# Patient Record
Sex: FEMALE | Race: WHITE
[De-identification: names, ages, dates, MRNs, and addresses within clinical notes are randomized per-mention and may not be internally consistent; named-entity substitution may affect disease eponyms.]

---

## 2023-01-05 PROBLEM — Z00.00 ENCOUNTER FOR PREVENTIVE HEALTH EXAMINATION: Status: ACTIVE | Noted: 2023-01-05

## 2023-01-06 ENCOUNTER — APPOINTMENT (OUTPATIENT)
Dept: PEDIATRIC ORTHOPEDIC SURGERY | Facility: CLINIC | Age: 58
End: 2023-01-06
Payer: COMMERCIAL

## 2023-01-06 VITALS — WEIGHT: 140 LBS | HEIGHT: 66 IN | TEMPERATURE: 97 F | BODY MASS INDEX: 22.5 KG/M2

## 2023-01-06 DIAGNOSIS — Z86.39 PERSONAL HISTORY OF OTHER ENDOCRINE, NUTRITIONAL AND METABOLIC DISEASE: ICD-10-CM

## 2023-01-06 DIAGNOSIS — M75.41 IMPINGEMENT SYNDROME OF RIGHT SHOULDER: ICD-10-CM

## 2023-01-06 DIAGNOSIS — B20 HUMAN IMMUNODEFICIENCY VIRUS [HIV] DISEASE: ICD-10-CM

## 2023-01-06 PROCEDURE — 73030 X-RAY EXAM OF SHOULDER: CPT

## 2023-01-06 PROCEDURE — 99202 OFFICE O/P NEW SF 15 MIN: CPT

## 2023-01-06 NOTE — HISTORY OF PRESENT ILLNESS
[de-identified] : This 57-year-old right-handed pleasant lady is seen today for evaluation of the right shoulder.  She was well until 1 month ago when she tripped and fell sustaining injury.  Since then she has had mild discomfort with no significant swelling.  She did not have a sensation of the shoulder moving out of place.  No clicking or popping.  No neck pain.  Her pain does not radiate distally.  Prior to this there was no history of shoulder complaint.  Her past medical history has been reviewed along with her medications.

## 2023-01-06 NOTE — ASSESSMENT
[FreeTextEntry1] : Impression: Mild impingement syndrome right shoulder.\par \par I have advised over-the-counter relief 2 tablets twice daily PC on the order of 10-14 days.  If she is not improving over time physical therapy would be her next step.  Return as necessary

## 2023-01-06 NOTE — PHYSICAL EXAM
[de-identified] : Physical examination today reveals full and supple motion to the cervical spine in all planes.  No significant points of paravertebral muscle spasm or tenderness present.  The right shoulder girdle reveals no evidence of inflammatory change/atrophy.  She does have good motion with only mild discomfort on full forward flexion/abduction in the scapular plane.  Rotation minimally restricted in external rotation there is no instability on stress of the glenohumeral joint.  No significant tenderness over the AC joint.  She is neurologically intact.\par \par X-rays ordered and taken today of the right shoulder reveals minimal degenerative changes no lesion